# Patient Record
Sex: FEMALE | Race: WHITE | NOT HISPANIC OR LATINO | Employment: STUDENT | URBAN - METROPOLITAN AREA
[De-identification: names, ages, dates, MRNs, and addresses within clinical notes are randomized per-mention and may not be internally consistent; named-entity substitution may affect disease eponyms.]

---
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
## 2023-07-06 DIAGNOSIS — M41.9 THORACIC SCOLIOSIS: Primary | ICD-10-CM

## 2023-07-11 NOTE — TELEPHONE ENCOUNTER
DIAGNOSIS: Back Pain - Hx of Surgery   APPOINTMENT DATE: 07/13/2023   NOTES STATUS DETAILS   OFFICE NOTE from referring provider SELF    OFFICE NOTE from other specialist Internal 08/30/2018 - Singh Deshpande MD - [facility]   OPERATIVE REPORT Internal 09/28/2015 - Posterior Instrumented Spinal Fusion T4-T10   MEDICATION LIST Care Everywhere    IMPLANT RECORD/STICKER Internal    LABS     CT SCAN PACS Internal   XRAYS (IMAGES & REPORTS) PACS Internal

## 2023-07-13 ENCOUNTER — PRE VISIT (OUTPATIENT)
Dept: ORTHOPEDICS | Facility: CLINIC | Age: 23
End: 2023-07-13

## 2023-08-10 ENCOUNTER — ANCILLARY PROCEDURE (OUTPATIENT)
Dept: GENERAL RADIOLOGY | Facility: CLINIC | Age: 23
End: 2023-08-10
Attending: ORTHOPAEDIC SURGERY
Payer: COMMERCIAL

## 2023-08-10 ENCOUNTER — OFFICE VISIT (OUTPATIENT)
Dept: ORTHOPEDICS | Facility: CLINIC | Age: 23
End: 2023-08-10
Payer: COMMERCIAL

## 2023-08-10 DIAGNOSIS — Z98.1 S/P FUSION OF THORACIC SPINE: Primary | ICD-10-CM

## 2023-08-10 DIAGNOSIS — M41.9 THORACIC SCOLIOSIS: ICD-10-CM

## 2023-08-10 PROCEDURE — 72082 X-RAY EXAM ENTIRE SPI 2/3 VW: CPT | Performed by: STUDENT IN AN ORGANIZED HEALTH CARE EDUCATION/TRAINING PROGRAM

## 2023-08-10 PROCEDURE — 77073 BONE LENGTH STUDIES: CPT | Performed by: STUDENT IN AN ORGANIZED HEALTH CARE EDUCATION/TRAINING PROGRAM

## 2023-08-10 PROCEDURE — 99203 OFFICE O/P NEW LOW 30 MIN: CPT | Performed by: ORTHOPAEDIC SURGERY

## 2023-08-10 RX ORDER — ETONOGESTREL AND ETHINYL ESTRADIOL .12; .015 MG/D; MG/D
RING VAGINAL
COMMUNITY
Start: 2023-02-06

## 2023-08-10 ASSESSMENT — ENCOUNTER SYMPTOMS
HEARTBURN: 1
ABDOMINAL PAIN: 0
DECREASED CONCENTRATION: 1
NERVOUS/ANXIOUS: 1
RECTAL PAIN: 0
DEPRESSION: 1
MUSCLE WEAKNESS: 0
MUSCLE CRAMPS: 0
CONSTIPATION: 0
DIARRHEA: 0
STIFFNESS: 0
VOMITING: 0
BLOATING: 1
NAUSEA: 0
JAUNDICE: 0
MYALGIAS: 1
PANIC: 0
ARTHRALGIAS: 1
NECK PAIN: 1
BACK PAIN: 1
JOINT SWELLING: 0
BLOOD IN STOOL: 0
BOWEL INCONTINENCE: 0
INSOMNIA: 1

## 2023-08-10 NOTE — NURSING NOTE
Reason For Visit:   Chief Complaint   Patient presents with    RECHECK     NEW SCOLIOSIS - back pain/ past surgeries with Dr Deshpande       Primary MD: Ashley Welch  Ref. MD: Dr Welch    ?  No  Occupation unemployed.  Currently working? No.  Work status?   Not working .  Date of injury: Several years  Type of injury: home.  Date of surgery: 9/28/2015  Type of surgery: S/P postier fusion.  Smoker: No  Request smoking cessation information: No    There were no vitals taken for this visit.    Pain Assessment  Patient Currently in Pain: No  0-10 Pain Scale: 0    Oswestry (ALEXSANDRA) Questionnaire        8/10/2023     1:10 PM   OSWESTRY DISABILITY INDEX   Count 10   Sum 7   Oswestry Score (%) 14 %            Neck Disability Index (NDI) Questionnaire         No data to display                       Visual Analog Pain Scale  Back Pain Scale 0-10: 0  Right leg pain: 0  Left leg pain: 0  Neck Pain Scale 0-10: 0  Right arm pain: 0  Left arm pain: 0    Promis 10 Assessment        8/30/2018     1:07 PM   PROMIS 10   In general, would you say your health is: Good   In general, would you say your quality of life is: Good   In general, how would you rate your physical health? Fair   In general, how would you rate your mental health, including your mood and your ability to think? Good   In general, how would you rate your satisfaction with your social activities and relationships? Good   In general, please rate how well you carry out your usual social activities and roles Good   To what extent are you able to carry out your everyday physical activities such as walking, climbing stairs, carrying groceries, or moving a chair? Moderately   In the past 7 days, how often have you been bothered by emotional problems such as feeling anxious, depressed, or irritable? Sometimes   In the past 7 days, how would you rate your fatigue on average? Moderate   In the past 7 days, how would you rate your pain on average, where 0  means no pain, and 10 means worst imaginable pain? 7   In general, would you say your health is: 3   In general, would you say your quality of life is: 3   In general, how would you rate your physical health? 2   In general, how would you rate your mental health, including your mood and your ability to think? 3   In general, how would you rate your satisfaction with your social activities and relationships? 3   In general, please rate how well you carry out your usual social activities and roles. (This includes activities at home, at work and in your community, and responsibilities as a parent, child, spouse, employee, friend, etc.) 3   To what extent are you able to carry out your everyday physical activities such as walking, climbing stairs, carrying groceries, or moving a chair? 3   In the past 7 days, how often have you been bothered by emotional problems such as feeling anxious, depressed, or irritable? 3   In the past 7 days, how would you rate your fatigue on average? 3   In the past 7 days, how would you rate your pain on average, where 0 means no pain, and 10 means worst imaginable pain? 7   Global Mental Health Score 12   Global Physical Health Score 10   PROMIS TOTAL - SUBSCORES 22                GENTRY [address]anks [address]

## 2023-08-10 NOTE — PROGRESS NOTES
Spine Surgical Hx:  09/21/2015 - PISF T4-T10; use of allograft (Jeramy) for AIS Lenke 1AN.  [Implants: Medtronic Solera screw system, 4.75 mm CoCr rods x2].      In-Person Visit    Chief Complaint   Patient presents with    RECHECK     NEW SCOLIOSIS - back pain/ past surgeries with Dr Deshpande       Last Visit Date: 8/30/18  Previous Impression:  Patient is approximately 3 years postop PISF T4-T10 for AIS presenting for postop follow-up as well as continued upper back pain and new lower back pain, mostly right sided, without neurological symptoms.   Previous Plan:  RTC prn.      S>  23 year old female, 8 yrs postop; last seen at 5 yrs.  Thus, her last visit was 5 yrs ago, and today's visit is considered a new patient visit.    Accompanied by mom.  Patient is now 8 years out from surgery.  Overall, happy with surgery.  However, she is here today because of off and on back pains.  This is not very well localized, and sometimes is in her thoracic spine, sometimes in her lumbar spine.  It seems to be precipitated by activities and prolonged standing.  She had been working as a .  Sometimes when she stands for too long, she feels like she has to pull her shoulders back, as her back becomes uncomfortable.  They also would sometimes ask her to lift heavy things, and she feels this also aggravates her back symptoms.  No specific traumatic incident.  No leg symptoms such as radicular pain, numbness, tingling, weakness.  No bowel or bladder control problems.  No other health issues.    Currently not working.  She is at present looking for a new job.    She does notice that her left shoulder is a little higher than the right.    Currently lives with her dad, but patient is thinking of moving out and getting her own place in [place].      Oswestry (ALEXSANDRA) Questionnaire        8/10/2023     1:10 PM   OSWESTRY DISABILITY INDEX   Count 10   Sum 7   Oswestry Score (%) 14 %      3yr postop 13.33  8yr  14%      Visual  Analog Pain Scale  Back Pain Scale 0-10: 0  Right leg pain: 0  Left leg pain: 0  Neck Pain Scale 0-10: 0  Right arm pain: 0  Left arm pain: 0    Physical Examination:    Alert, oriented x 3, cooperative.  Not in CP distress.  There were no vitals taken for this visit.  Ambulates independently.   Grossly neurologically intact all extremities.    On standing upright, C7 plumbline falls along midline.  Left shoulder slightly higher than right approximately 1 cm.  Pelvis level.  On [place] forward bending test, residual right thoracic ATR measures 16 degrees.  Small right lumbar ATR measuring 4 degrees.  Posterior midline surgical scar over thoracic spine appears well-healed, no sign of infection/inflammation.  No tenderness.    Imaging:    EOS full body AP lateral standing x-rays taken today show stable posterior bilateral segmental instrumentation T4-T10.  No sign of fixation loosening or failure.  Compared against x-rays from 5 years ago 2018, there has been no significant interval progression of residual scoliosis deformity.  No significant sagittal imbalance.  No sign of proximal or distal junctional failure.  Overall, reassuring radiographs.     Assessment:    1.  8 years s/p PISF T4-T10 for AIS, with presumed solid arthrodesis, doing very well overall.  2.  Chronic off and on mechanical thoracic and lumbar back pain.  3.  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    Plan:    Had a good discussion with patient and mom.  I reassured them regarding my clinical and radiographic findings.  I believe her thoracic fusion is now solid at all levels.  There is no evidence of fixation loosening or failure.  She does have some residual scoliosis and rotational deformity; however, much better compared to preoperative x-rays.  I also do not think there has been any progression of deformity since her last set of x-rays 5 years ago.  She presents with mechanical thoracolumbar back pain.  I  believe this is very much amenable to physical therapy.  Patient amenable.  Mom says they also regularly go to a chiropractor (Dr. Monsalve) who also performs acupuncture.  They asked me if this is okay.  I am fully in support of this.  She also asked about doing swimming.  I believe this is a very good exercise, and encouraged her to do this.    If symptoms do not improve after approximately 3 months of physical therapy, we will consider referring to our medical spine colleagues for further nonoperative treatment.  -Work letter printed, recommending 20 pound lifting restriction at work.  Return to clinic as needed.    20 minutes spent on the date of the encounter doing chart review/review of outside records/review of test results/interpretation of tests/patient visit/documentation/discussion with other provider(s)/discussion with patient and family.    Singh Deshpande MD    Orthopaedic Spine Surgery  Dept Orthopaedic Surgery, [facility]  [phone] office, [phone] pager  [email or web address]

## 2023-08-10 NOTE — LETTER
Return to Work  August 10, 2023     Seen today: Yes    Patient:  Brittany Crawford  :   2000  MRN:     [id number]  Physician: DINH DEHSPANDE    Brittany Huffmangustavo may return to work on Date: any date/ may return to work anytime..      The next clinic appointment is scheduled for (date/time) prn (as needed).    Patient limitations:    Given patient's previous spinal fusion, recommend no lifting > 20 lbs at work.  Recommend allowing patient to take 10 min breaks as needed for occasional pain flare-ups.          Electronically signed by Dinh Deshpande MD             no

## 2023-08-10 NOTE — LETTER
8/10/2023         RE: Brittany Crawford  [address]  [facility] [address]        Dear Colleague,    Thank you for referring your patient, Brittany Crawford, to the [facility] [place]. Please see a copy of my visit note below.     Spine Surgical Hx:  09/21/2015 - PISF T4-T10; use of allograft (Jeremybrano) for AIS Lenke 1AN.  [Implants: [employer] Solera screw system, 4.75 mm CoCr rods x2].      In-Person Visit    Chief Complaint   Patient presents with    RECHECK     NEW SCOLIOSIS - back pain/ past surgeries with Dr Deshpande       Last Visit Date: 8/30/18  Previous Impression:  Patient is approximately 3 years postop PISF T4-T10 for AIS presenting for postop follow-up as well as continued upper back pain and new lower back pain, mostly right sided, without neurological symptoms.   Previous Plan:  RTC prn.      S>  23 year old female, 8 yrs postop; last seen at 5 yrs.  Thus, her last visit was 5 yrs ago, and today's visit is considered a new patient visit.    Accompanied by mom.  Patient is now 8 years out from surgery.  Overall, happy with surgery.  However, she is here today because of off and on back pains.  This is not very well localized, and sometimes is in her thoracic spine, sometimes in her lumbar spine.  It seems to be precipitated by activities and prolonged standing.  She had been working as a .  Sometimes when she stands for too long, she feels like she has to pull her shoulders back, as her back becomes uncomfortable.  They also would sometimes ask her to lift heavy things, and she feels this also aggravates her back symptoms.  No specific traumatic incident.  No leg symptoms such as radicular pain, numbness, tingling, weakness.  No bowel or bladder control problems.  No other health issues.    Currently not working.  She is at present looking for a new job.    She does notice that her left shoulder is a little higher than the  right.    Currently lives with her dad, but patient is thinking of moving out and getting her own place in [place].      Oswestry (ALEXSANDRA) Questionnaire        8/10/2023     1:10 PM   OSWESTRY DISABILITY INDEX   Count 10   Sum 7   Oswestry Score (%) 14 %      3yr postop 13.33  8yr  14%      Visual Analog Pain Scale  Back Pain Scale 0-10: 0  Right leg pain: 0  Left leg pain: 0  Neck Pain Scale 0-10: 0  Right arm pain: 0  Left arm pain: 0    Physical Examination:    Alert, oriented x 3, cooperative.  Not in CP distress.  There were no vitals taken for this visit.  Ambulates independently.   Grossly neurologically intact all extremities.    On standing upright, C7 plumbline falls along midline.  Left shoulder slightly higher than right approximately 1 cm.  Pelvis level.  On [place] forward bending test, residual right thoracic ATR measures 16 degrees.  Small right lumbar ATR measuring 4 degrees.  Posterior midline surgical scar over thoracic spine appears well-healed, no sign of infection/inflammation.  No tenderness.    Imaging:    EOS full body AP lateral standing x-rays taken today show stable posterior bilateral segmental instrumentation T4-T10.  No sign of fixation loosening or failure.  Compared against x-rays from 5 years ago 2018, there has been no significant interval progression of residual scoliosis deformity.  No significant sagittal imbalance.  No sign of proximal or distal junctional failure.  Overall, reassuring radiographs.     Assessment:    1.  8 years s/p PISF T4-T10 for AIS, with presumed solid arthrodesis, doing very well overall.  2.  Chronic off and on mechanical thoracic and lumbar back pain.  3.  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    Plan:    Had a good discussion with patient and mom.  I reassured them regarding my clinical and radiographic findings.  I believe her thoracic fusion is now solid at all levels.  There is no evidence of fixation  loosening or failure.  She does have some residual scoliosis and rotational deformity; however, much better compared to preoperative x-rays.  I also do not think there has been any progression of deformity since her last set of x-rays 5 years ago.  She presents with mechanical thoracolumbar back pain.  I believe this is very much amenable to physical therapy.  Patient amenable.  Mom says they also regularly go to a chiropractor (Dr. Monsalve) who also performs acupuncture.  They asked me if this is okay.  I am fully in support of this.  She also asked about doing swimming.  I believe this is a very good exercise, and encouraged her to do this.    If symptoms do not improve after approximately 3 months of physical therapy, we will consider referring to our medical spine colleagues for further nonoperative treatment.  -Work letter printed, recommending 20 pound lifting restriction at work.  Return to clinic as needed.    20 minutes spent on the date of the encounter doing chart review/review of outside records/review of test results/interpretation of tests/patient visit/documentation/discussion with other provider(s)/discussion with patient and family.    Singh Deshpande MD    Orthopaedic Spine Surgery  Dept Orthopaedic Surgery, [facility]  [phone] office, [phone] pager  [email or web address]